# Patient Record
Sex: MALE | Race: BLACK OR AFRICAN AMERICAN | URBAN - METROPOLITAN AREA
[De-identification: names, ages, dates, MRNs, and addresses within clinical notes are randomized per-mention and may not be internally consistent; named-entity substitution may affect disease eponyms.]

---

## 2020-08-31 ENCOUNTER — HOSPITAL ENCOUNTER (OUTPATIENT)
Dept: PHYSICAL THERAPY | Age: 20
Discharge: HOME | End: 2020-08-31

## 2020-08-31 NOTE — PROGRESS NOTES
Roger Bacon. : 2000  Primary:   Secondary:  [address]  at [address], [address].  [phone]   [phone]         Patient came to scheduled appointment 10 minutes late. He was informed that his insurance did not cover PT and he decided not to stay.

## 2020-09-03 ENCOUNTER — HOSPITAL ENCOUNTER (OUTPATIENT)
Dept: PHYSICAL THERAPY | Age: 20
Discharge: HOME | End: 2020-09-03
Payer: COMMERCIAL

## 2020-09-03 PROCEDURE — 97161 PT EVAL LOW COMPLEX 20 MIN: CPT

## 2020-09-03 NOTE — THERAPY EVALUATION
Jose Templeton. : 2000 Primary: [employer] 
Secondary:  [address] at [facility] JOLIE RUIZ [address] [facility], [address] [place] [address],[address], [address] [facility] [phone]   [phone] OUTPATIENT PHYSICAL THERAPY:Initial Assessment 9/3/2020 ICD-10: Treatment Diagnosis: Pain in right leg (M79.604) Precautions/Allergies: NKDA TREATMENT PLAN: 
Effective Dates: 9/3/2020 TO 2020 (60 days). Frequency/Duration: 1-2 times a week for 60 Day(s) MEDICAL/REFERRING DIAGNOSIS: 
knee pain DATE OF ONSET: \"about 3 months ago\" REFERRING PHYSICIAN: Ashley Perry MD MD Orders: Lynda Shin and treat Return MD Appointment: TBD INITIAL ASSESSMENT:  Mr. Noel Hedrick is a 21year old male with complaints of pain in lateral R upper leg with prolonged standing or walking. He notes that pain goes away when he sits. He presents with pain in R LE, but good knee and hip ROM bilaterally and good LE strength. Lumbar flexion, extension, rotation and side bend all appear WNL. He does have very tight hamstrings bilaterally and this may be putting a strain on his back when he stands for long periods of time. He may benefit from PT to address deficits and work toward goals. Visits may be limited by patient for financial reasons. PROBLEM LIST (Impacting functional limitations): 
1. Increased Pain INTERVENTIONS PLANNED: (Treatment may consist of any combination of the following) 1. Home Exercise Program (HEP) 2. Manual Therapy 3. Therapeutic Exercise/Strengthening 4. modals as needed GOALS: (Goals have been discussed and agreed upon with patient.) Patient's stated goal is to play basketball again. Short-Term Functional Goals: none set Discharge Goals: Time Frame: 60 days 1. Patient to report no more than minimal R LE pain with activity 2. Patient to improve LEFS score to 65 (from 51 at eval) to demo improved functional mobility 3. Patient to report being able to play basketball again. OUTCOME MEASURE:  
Tool Used: Lower Extremity Functional Scale (LEFS) Score:  Initial: 51/80 Most Recent: X/80 (Date: -- ) Interpretation of Score: 20 questions each scored on a 5 point scale with 0 representing \"extreme difficulty or unable to perform\" and 4 representing \"no difficulty\". The lower the score, the greater the functional disability. 80/80 represents no disability. Minimal detectable change is 9 points. MEDICAL NECESSITY:  
· Patient demonstrates good rehab potential due to higher previous functional level. REASON FOR SERVICES/OTHER COMMENTS: 
· Patient continues to require skilled intervention due to need to return to higher level of function. Total Duration: 37 minutes PT Patient Time In/Time Out Time In: 1581 Time Out: 1995 Rehabilitation Potential For Stated Goals: Good Regarding Perry Natacha Ramirez's therapy, I certify that the treatment plan above will be carried out by a therapist or under their direction. Thank you for this referral, 
 
Ike Zapien, PT Referring Physician Signature: Regla Jalloh MD No Signature is Required for this note. PAIN/SUBJECTIVE:  
Initial: Pain Intensity 1: 3(3 now, 7 or 8 at worst)   Post Session:  Pain not rated at end of session HISTORY:  
History of Injury/Illness (Reason for Referral): 
Patient reports insidious onset of lateral R leg pain about 3 months ago. He notices it when standing or walking for prolonged periods of time, but it eases with sitting. He reports that xray of R LE was negative. Past Medical History/Comorbidities: None Social History/Living Environment:  
  non-contributory Prior Level of Function/Work/Activity: 
Works at [employer] and stands while on the job Ambulatory/Rehab Services H2 Model Falls Risk Assessment Risk Factors: 
     (1)  Gender [Male] Ability to Rise from Chair: 
     (0)  Ability to rise in a single movement Falls Prevention Plan: No modifications necessary Total: (5 or greater = High Risk): 1 ©2010 [facility] Patent #[id number]. Federal Law prohibits the replication, distribution or use without written permission from [facility] [employer] Current Medications: None Date Last Reviewed:  9/3/20 Number of Personal Factors/Comorbidities that affect the Plan of Care: 0: LOW COMPLEXITY EXAMINATION:  
 
Observation/Orthostatic Postural Assessment: patient in no acute distress Palpation: No pain with palpation ROM: B knees WNL with no focal deficits B hamstrings very tight Strength: 5/5 in B LEs Neurological Screen: light touch intact in B LEs today, but patient notes that L leg sometimes goes numb when he gets pain in the leg. Functional Mobility:  independent Balance:  Not formally tested Body Structures Involved: 1. Joints 2. Muscles Body Functions Affected: 1. Neuromusculoskeletal 
2. Movement Related Activities and Participation Affected: 1. General Tasks and Demands 2. Mobility 3. Community, Social and [place] Number of elements (examined above) that affect the Plan of Care: 1-2: LOW COMPLEXITY CLINICAL PRESENTATION:  
Presentation: Evolving clinical presentation with changing clinical characteristics: MODERATE COMPLEXITY CLINICAL DECISION MAKING:  
Use of outcome tool(s) and clinical judgement create a POC that gives a: Questionable prediction of patient's progress: MODERATE COMPLEXITY

## 2020-09-29 NOTE — THERAPY DISCHARGE
Dinh Robles. : 2000 Primary: [employer] 
Secondary:  [address] at [facility] JOLIE RUIZ [address] [facility], [address] [place] [address],[address], [address] [facility] [phone]   [phone] OUTPATIENT PHYSICAL THERAPY:Discontinuation Summary 2020 ICD-10: Treatment Diagnosis: Pain in right leg (M79.604) Precautions/Allergies: NKDA TREATMENT PLAN: 
Effective Dates: 9/3/2020 TO 2020 (60 days). Frequency/Duration: 1-2 times a week for 60 Day(s) Patient attended initial PT evaluation only MEDICAL/REFERRING DIAGNOSIS: 
knee pain DATE OF ONSET: \"about 3 months ago\" REFERRING PHYSICIAN: Nieves Wylie MD MD Orders: Kelsi Ovens and treat Return MD Appointment: TBD ASSESSMENT:  Mr. David Doran is a 21year old male with complaints of pain in lateral R upper leg with prolonged standing or walking. He noted that pain goes away when he sits. He presented with pain in R LE, but good knee and hip ROM bilaterally and good LE strength. Lumbar flexion, extension, rotation and side bend all appeared WNL. He did have very tight hamstrings bilaterally and this may be putting a strain on his back when he stands for long periods of time. He did not return to PT after initial evaluation and he will now be discontinued from PT.   
 
GOALS: unable to assess progress toward goals. Patient's stated goal is to play basketball again. Short-Term Functional Goals: none set Discharge Goals: Time Frame: 60 days 1. Patient to report no more than minimal R LE pain with activity 2. Patient to improve LEFS score to 65 (from 51 at eval) to demo improved functional mobility 3. Patient to report being able to play basketball again. OUTCOME MEASURE:  
Tool Used: Lower Extremity Functional Scale (LEFS) Score:  Initial:  Most Recent:  (Date: -- ) Interpretation of Score: 20 questions each scored on a 5 point scale with 0 representing \"extreme difficulty or unable to perform\" and 4 representing \"no difficulty\". The lower the score, the greater the functional disability. 80/80 represents no disability. Minimal detectable change is 9 points. Data from initial evaluation: PAIN/SUBJECTIVE:  
Initial: Pain Intensity 1: 3(3 now, 7 or 8 at worst)   Post Session:  Pain not rated at end of session HISTORY:  
History of Injury/Illness (Reason for Referral): 
Patient reports insidious onset of lateral R leg pain about 3 months ago. He notices it when standing or walking for prolonged periods of time, but it eases with sitting. He reports that xray of R LE was negative. Past Medical History/Comorbidities: None Social History/Living Environment:  
  non-contributory Prior Level of Function/Work/Activity: 
Works at [employer] and stands while on the job Current Medications: None Date Last Reviewed:  9/3/20 EXAMINATION:  
 
Observation/Orthostatic Postural Assessment: patient in no acute distress Palpation: No pain with palpation ROM: B knees WNL with no focal deficits B hamstrings very tight Strength: 5/5 in B LEs Neurological Screen: light touch intact in B LEs today, but patient notes that L leg sometimes goes numb when he gets pain in the leg. Functional Mobility:  independent Balance:  Not formally tested Body Structures Involved: 1. Joints 2. Muscles Body Functions Affected: 1. Neuromusculoskeletal 
2. Movement Related Activities and Participation Affected: 1. General Tasks and Demands 2. Mobility 3. Community, Social and [address]